# Patient Record
Sex: MALE | Race: OTHER | HISPANIC OR LATINO | URBAN - METROPOLITAN AREA
[De-identification: names, ages, dates, MRNs, and addresses within clinical notes are randomized per-mention and may not be internally consistent; named-entity substitution may affect disease eponyms.]

---

## 2018-10-29 ENCOUNTER — EMERGENCY (EMERGENCY)
Facility: HOSPITAL | Age: 38
LOS: 1 days | Discharge: HOME | End: 2018-10-29
Attending: EMERGENCY MEDICINE | Admitting: EMERGENCY MEDICINE
Payer: COMMERCIAL

## 2018-10-29 VITALS
RESPIRATION RATE: 17 BRPM | DIASTOLIC BLOOD PRESSURE: 81 MMHG | OXYGEN SATURATION: 100 % | HEART RATE: 87 BPM | TEMPERATURE: 98 F | SYSTOLIC BLOOD PRESSURE: 130 MMHG

## 2018-10-29 PROCEDURE — 99284 EMERGENCY DEPT VISIT MOD MDM: CPT

## 2018-10-29 RX ORDER — FLUTICASONE PROPIONATE 50 MCG
2 SPRAY, SUSPENSION NASAL
Qty: 1 | Refills: 0 | OUTPATIENT
Start: 2018-10-29 | End: 2018-11-04

## 2018-10-29 RX ORDER — KETOROLAC TROMETHAMINE 30 MG/ML
15 SYRINGE (ML) INJECTION ONCE
Qty: 0 | Refills: 0 | Status: DISCONTINUED | OUTPATIENT
Start: 2018-10-29 | End: 2018-10-29

## 2018-10-29 RX ORDER — SODIUM CHLORIDE 9 MG/ML
1000 INJECTION INTRAMUSCULAR; INTRAVENOUS; SUBCUTANEOUS ONCE
Qty: 0 | Refills: 0 | Status: COMPLETED | OUTPATIENT
Start: 2018-10-29 | End: 2018-10-29

## 2018-10-29 RX ORDER — METOCLOPRAMIDE HCL 10 MG
10 TABLET ORAL ONCE
Qty: 0 | Refills: 0 | Status: COMPLETED | OUTPATIENT
Start: 2018-10-29 | End: 2018-10-29

## 2018-10-29 RX ORDER — FLUTICASONE PROPIONATE 50 MCG
1 SPRAY, SUSPENSION NASAL DAILY
Qty: 0 | Refills: 0 | Status: DISCONTINUED | OUTPATIENT
Start: 2018-10-29 | End: 2018-11-02

## 2018-10-29 RX ADMIN — SODIUM CHLORIDE 1000 MILLILITER(S): 9 INJECTION INTRAMUSCULAR; INTRAVENOUS; SUBCUTANEOUS at 17:11

## 2018-10-29 RX ADMIN — Medication 15 MILLIGRAM(S): at 17:11

## 2018-10-29 RX ADMIN — Medication 10 MILLIGRAM(S): at 17:11

## 2018-10-29 NOTE — ED PROVIDER NOTE - PHYSICAL EXAMINATION
PGY1/MD Lang.   VITALS: reviewed  GEN: No apparent distress, A & O x 4  HEAD/EYES: NC/AT, PERRL, EOMI, anicteric sclerae, no conjunctival pallor  ENT: mucus membranes moist, oropharynx WNL, trachea midline, no JVD, neck is supple  RESP: lungs CTA with equal breath sounds bilaterally, chest wall nontender and atraumatic  CV: heart with reg rhythm S1, S2, no murmur; distal pulses intact and symmetric bilaterally  ABDOMEN: normoactive bowel sounds, soft, nondistended, nontender, no palpable masses  MSK: extremities atraumatic and nontender, no edema, no asymmetry. the back is without midline or lateral tenderness, there is no spinal deformity or stepoff and the back is ranged painlessly. the neck has no midline tenderness, deformity, or stepoff, and is ranged painlessly.  SKIN: warm, dry, no rash, no bruising, no cyanosis. color appropriate for ethnicity  NEURO: alert, mentating appropriately, no facial asymmetry. CNS 2-12 intact, gross sensation normal, motor, coordination are intact  PSYCH: Affect appropriate

## 2018-10-29 NOTE — ED PROVIDER NOTE - PLAN OF CARE
Thank you for visiting our Emergency Department, it has been a pleasure taking part in your healthcare.    You had a thorough evaluation including an exam, labs and imaging. You were given medications for comfort. Your workup did not demonstrate any concerning findings. This does not mean that your pain is not real, only that we were unable to find a dangerous or life-threatening cause. Please read the attached information sheets as they will provide useful information regarding your condition.    Your discharge diagnosis is: headache, unspecific reason  Return precautions to the Emergency Department include but are not limited to: unrelenting nausea, vomiting, fever, chest pain, shortness of breath, syncope, blood in urine or stool, headache that doesn't resolve, numbness or tingling, loss of sensation, loss of motor function, or any other concerning symptoms.    1) Follow up with your medical doctor in 2-3 days or call our clinic at [phone] and state you were seen in the Emergency Department and would like to be seen in clinic.  2) You should also establish care with Neurology by calling [phone]  to find a doctor affiliated with [facility] in your neighborhood & network. Please bring your labs and imaging with you to your appointment, as needed.  3) Please take Ibuprofen (aka Motrin, Advil) and/or Acetaminophen (aka Tylenol) over the counter as directed by packaging, as needed, for mild-moderate pain, which can be taken 3-4 hours apart from each other to have a layered effect.  Please do not take these medications if you do no have pain or if you have any history of bleeding disorders, kidney or liver disease. Do not use ibuprofen if you are on blood thinners (anti-coagulation).  4) Drink at least 2 Liters or 64 Ounces of water each day.

## 2018-10-29 NOTE — ED PROVIDER NOTE - CARE PLAN
Principal Discharge DX:	Headache Principal Discharge DX:	Headache  Assessment and plan of treatment:	Thank you for visiting our Emergency Department, it has been a pleasure taking part in your healthcare.    You had a thorough evaluation including an exam, labs and imaging. You were given medications for comfort. Your workup did not demonstrate any concerning findings. This does not mean that your pain is not real, only that we were unable to find a dangerous or life-threatening cause. Please read the attached information sheets as they will provide useful information regarding your condition.    Your discharge diagnosis is: headache, unspecific reason  Return precautions to the Emergency Department include but are not limited to: unrelenting nausea, vomiting, fever, chest pain, shortness of breath, syncope, blood in urine or stool, headache that doesn't resolve, numbness or tingling, loss of sensation, loss of motor function, or any other concerning symptoms.    1) Follow up with your medical doctor in 2-3 days or call our clinic at [phone] and state you were seen in the Emergency Department and would like to be seen in clinic.  2) You should also establish care with Neurology by calling [phone]  to find a doctor affiliated with [facility] in your neighborhood & network. Please bring your labs and imaging with you to your appointment, as needed.  3) Please take Ibuprofen (aka Motrin, Advil) and/or Acetaminophen (aka Tylenol) over the counter as directed by packaging, as needed, for mild-moderate pain, which can be taken 3-4 hours apart from each other to have a layered effect.  Please do not take these medications if you do no have pain or if you have any history of bleeding disorders, kidney or liver disease. Do not use ibuprofen if you are on blood thinners (anti-coagulation).  4) Drink at least 2 Liters or 64 Ounces of water each day.  Secondary Diagnosis:	Sinusitis, unspecified chronicity, unspecified location

## 2018-10-29 NOTE — ED PROVIDER NOTE - ATTENDING CONTRIBUTION TO CARE
I performed a face-to-face evaluation of the patient and performed a history and physical examination. I agree with the history and physical examination.    Maribell: H/o sinusitis. 2 wks HA behind L eye, progressing to L head. Subjective (B) hand numbness. Under stress b/c mother has brain aneurysm. No nasal discharge or F. Shared decision-making: he decided not to have CT scan, and to f/u w/ Neuro. Plan: Toradol, Reglan, steroid inhaler for possible sinus pressure. I performed a face-to-face evaluation of the patient and performed a history and physical examination. I agree with the history and physical examination.    Maribell: H/o sinusitis. 2 wks HA behind L eye, progressing to L head. Not pulsing; no N/V. Subjective (B) hand numbness. No carbon monoxide exposuse. Under stress b/c mother has brain aneurysm. No nasal discharge or F. Shared decision-making: he decided not to have CT scan, and to f/u w/ Neuro. Plan: Toradol, Reglan, steroid inhaler for possible sinus pressure.

## 2018-10-29 NOTE — ED PROVIDER NOTE - OBJECTIVE STATEMENT
PGY1/MD Lang. 37 yo M with h/o sinusitis, p/w non improved headache x 2wks. He has intermittent headache, at least once a day, mostly bilateral head without pounding sensation but recently noticed that he has more pain around his eye and pain travels to his occipital. He tried some ibuprofen but it was not helpful and he tends not to use medications. Pt has had nasal congestion, seen by ENT 2 yrs ago, and states that the doctor lost his chance to receive definitive treatment for his sinusitis. No fever, no difficulty speaking/walking. c/o some numbness on both upper arms, but no tingling/numbness on legs. No neck pain. PGY1/MD Lang. 37 yo M with h/o sinusitis, p/w non improved headache x 2wks. He has intermittent headache, at least once a day, mostly bilateral head without pounding sensation but recently noticed that he has more pain around his eye and pain travels to his occipital. He tried some ibuprofen but it was not helpful and he tends not to use medications. Pt has had nasal congestion, seen by ENT 2 yrs ago, and states that the doctor lost his chance to receive definitive treatment for his sinusitis. No fever, no difficulty speaking/walking. c/o some numbness on both upper arms, but no tingling/numbness on legs. No neck pain. Pt's mother has cerebral arterial aneurysm.

## 2018-10-29 NOTE — ED PROVIDER NOTE - PROGRESS NOTE DETAILS
PGY1/MD Lang. pt feels better, with no symptoms, request discharge with prescription of flonase. No e/o subarachnoid hemorrhage, intracranial bleed, meningitis, encephalitis, temporal arteritis, or intracranial mass. I have given the pt strict return and follow up precautions. The patient has been provided with a copy of all pertinent results. The patient has been informed of all concerning signs and symptoms to return to Emergency Department, the necessity to follow up with PMD/Clinic/follow up provided within 2-3 days was explained, and the patient reports understanding of above with capacity and insight. PGY1/MD Lang. pt feels better, with no symptoms, request discharge with prescription of flonase. No e/o subarachnoid hemorrhage, intracranial bleed, meningitis, encephalitis, temporal arteritis, or acute expansion of intracranial mass. prolonged symptoms warrant neurology work up including intracranial tumor. I have given the pt strict return and follow up precautions. The patient has been provided with a copy of all pertinent results. The patient has been informed of all concerning signs and symptoms to return to Emergency Department, the necessity to follow up with PMD/Clinic/follow up provided within 2-3 days was explained, and the patient reports understanding of above with capacity and insight.

## 2018-10-29 NOTE — ED PROVIDER NOTE - MEDICAL DECISION MAKING DETAILS
Maribell: H/o sinusitis. 2 wks HA behind L eye, progressing to L head. Subjective (B) hand numbness. Under stress b/c mother has brain aneurysm. No nasal discharge or F. Shared decision-making: he decided not to have CT scan, and to f/u w/ Neuro. Plan: Toradol, Reglan, steroid inhaler for possible sinus pressure.